# Patient Record
(demographics unavailable — no encounter records)

---

## 2019-01-16 NOTE — RADIOLOGY REPORT (SQ)
EXAM DESCRIPTION:  U/S OB LIMITED



COMPLETED DATE/TIME:  2019 11:38 am



REASON FOR STUDY:   contractions



COMPARISON:  None.



TECHNIQUE:  Limited transabdominal grayscale ultrasound for evaluation of specific requested obstetri
janett parameters.



LIMITATIONS:  None.



FINDINGS:  CERVICAL LENGTH: 4.3 cm.   Closed.

LUIS: 12 cm.

FHR: 153 beats per minute.

PRESENTATION: Cephalic.

PLACENTA: Posterior without evidence of abruption or previa.

FETAL ANATOMY: Not assessed

OTHER: No other significant findings.



IMPRESSION:  LIMITED OBSTETRICAL ULTRASOUND WITH MEASURED PARAMETERS DELINEATED ABOVE.

Trimester of pregnancy: Third trimester - 28 weeks to delivery.



TECHNICAL DOCUMENTATION:  JOB ID:  [id number]

  [employer]- All Rights Reserved



Reading location - IP/workstation name: [email or web address]

## 2019-01-16 NOTE — ADMISSION PHYSICAL
=================================================================



=================================================================

Datetime Report Generated by CPN: 2019 16:28

   

   

=================================================================

CURRENT ADMISSION

=================================================================

   

Chief Complaint:  Uterine Contractions

Indication for Induction:  Not Applicable

Admit Impression :  , Intrauterine Pregnancy

Admit Plan:  Admit to Unit; Initiate  Labor Protocol

   

=================================================================

ALLERGIES

=================================================================

   

Medication Allergies:  No

Medication Allergies:  No Known Allergies (2019)

Latex:  No Latex Allergies

Food Allergies:  None

Environmental Allergies:  none

   

=================================================================

OBSTETRICAL HISTORY

=================================================================

   

EDC:  2019 00:00

:  4

Para:  2

:  2

SAB:  1

Livin

Cesareans:  0

Gestational Diabetes:  No

Rh Sensitization:  No

Incompetent Cervix:  No

SHEREE:  No

Infertility:  No

ART Treatment:  No

Uterine Anomaly:  No

IUGR:  No

Hx Previous C/S:  No

Macrosomia:  No

Hx Loss/Stillborn:  No

PIH:  No

Hx  Death:  No

Placenta Previa/Abruption:  No

Depression/PP Depression:  Yes

PTL/PROM:  Yes

Post Partum Hemorrhage:  No

Current Pregnancy Procedures:  Ultrasound; NST

Obstetrical History Comments:  G1- , SAB

   G2- , 34 weeks, PTL, PROM, PTD, NICU x 1 month

   G3- , 32 weeks, PTL, PROM, PTD

   G4- Current

   

=================================================================

***SEE PRENATAL RECORDS***

=================================================================

   

Alcohol:  No

Marijuana :  No

Cocaine:  No

Other Illicit Drugs:  No

Cigarettes:  Never Smoker. [id number]

   

=================================================================

MEDICAL HISTORY

=================================================================

   

Diabetes:  No

Blood Transfusion:  No

Pulmonary Disease (Asthma, TB):  No

Breast Disease:  No

Hypertension:  No

Gyn Surgery:  No

Heart Disease:  No

Hosp/Surgery:  No

Autoimmune Disorder:  No

Anesthetic Complications:  No

Kidney Disease:  No

Abnormal Pap Smear:  No

Neuro/Epilepsy:  No

Psychiatric Disorders:  No

Other Medical Diseases:  No

Hepatitis/Liver Disease:  No

Significant Family History:  No

Varicosities/Phlebitis:  No

Trauma/Violence :  No

Thyroid Dysfunction:  Yes

Medical History Comments:  Depression 2016, Thyroid elevated TSH and on

   synthroid, Hospitalizations for childbirth.

   

=================================================================

INFECTIOUS HISTORY

=================================================================

   

Gonorrhea:  No

Genital Herpes:  No

Chlamydia:  No

Tuberculosis:  No

Syphilis:  No

Hepatitis:  No

HIV/AIDS Exposure:  No

Rash or Viral Illness:  No

HPV:  No

   

=================================================================

PHYSICAL EXAM

=================================================================

   

General:  Normal

HEENT:  Normal

Neurologic:  Normal

Thyroid:  Deferred

Heart:  Normal

Lungs:  Normal

Breast:  Deferred

Back:  Normal

Abdomen:  Normal

Genitourinary Exam:  Normal

Extremities:  Normal

DTRs:  Normal

Pelvic Type:  Adequate

Physical Exam Comments:  pelvis proven to 5#9

Vital Signs:  Reviewed; Within Normal Limits

   

=================================================================

VAGINAL EXAM

=================================================================

   

Dilatation:  1

Effacement:  0

Station:  -3

Contraction Comments:  q4-6 mins

   

=================================================================

FETUS A

=================================================================

   

EGA:  29.6

Monitoring:  External US

FHR- Baseline:  135

Variability:  Moderate 6-25bpm

Accelerations:  15X15

Decelerations:  None

FHR Category:  Category I

Estimated Fetal Weight (gm):  1400

Fetal Presentation:  Vertex

Fetal Presentation- Other:  by sono

Admit Comment:   with previous  deliveries at 19w 32w and

   34w presented at 29+6 with contractions q2 mins, painful. she was

   given procardia 10mg po, terbutaline 0.25 subcutaneously and

   betamethasone 12mg IM at 1255. c/w dr newman-pt started on magnesium

   sulfate for PTL and neurophophylaxis. after magnesium started,

   contractions spaced to 6mins apart and decreased pain. GBS culture

   sent to lab. 

   

=================================================================

PLANS FOR LABOR AND DELIVERY

=================================================================

   

Labor and Delivery:  None

Pain Management:  Natural

Feeding Preference:  Formula

Benefit of Breast Feed Discussed:  Yes

Circumcision:  N/A

   

=================================================================

INFORMED CONSENT

=================================================================

   

Assignment:  Lisa Newman MD

Signature:  Electronically signed by Angie Siegel CNM  on 2019 at

   16:27  with User ID: [email or web address]

:  Electronically signed by Angie Siegel CNM  on 2019 at 16:27  with

   User ID: [email or web address]

## 2019-01-21 NOTE — PDOC DISCHARGE SUMMARY
General





- Admit/Disc Date/PCP


Admission Date/Primary Care Provider: 


  19 14:12





  





Discharge Date: 19





- Discharge Diagnosis


(1)  uterine contractions in second trimester, antepartum


Is this a current diagnosis for this admission?: Yes   





- Additional Information


Discharge Diet: Regular


Discharge Activity: Activity As Tolerated, Pelvic Rest


Home Medications: 








Levothyroxine Sodium [Synthroid 0.075 mg Tablet] 0.075 mg PO DAILY 19 


Pantoprazole Sodium [Protonix] 40 mg PO DAILY 19 


Prenatal Vit No.130/Iron/Folic [Prenatal Vitamins] 1 each PO DAILY 19 











History of Present Illness


History of Present Illness: 


KIRA MAAZRIEGOS is a 32 year old female presented to L&D complaining of 

contractions.  Patient stated that she had a history of 2  deliveries.  

Patient denies spontaneous rupture membranes or leakage of fluid.  Patient 

reported good fetal movement.








Hospital Course


Hospital Course: 


Patient was placed on magnesium sulfate until her contractions spaced out.  In 

the meantime, she was given betamethasone 12 mg IM x2, 24 hours apart.  On day 

#2, the patient was weaned down from 2 mg of magnesium sulfate to 1 g and the 

magnesium was discontinued at approximately 12 PM.  Patient was very anxious to 

leave.








Physical Exam





- Physical Exam


General appearance: PRESENT: no acute distress


Respiratory exam: PRESENT: clear to auscultation sarwat


Cardiovascular exam: PRESENT: RRR


GI/Abdominal exam: PRESENT: normal bowel sounds, soft


Extremities exam: ABSENT: calf tenderness, clubbing, full ROM, joint swelling, 

pedal edema, tenderness, +1 edema, +2 edema, other





- Gynecological Exam


Cervix: normal - No cervical change





Result


Laboratory Results: 


                                        





                                 19 14:30 








Impressions: 


                                        





Obstetrics Ultrasound  19 10:31


IMPRESSION:  LIMITED OBSTETRICAL ULTRASOUND WITH MEASURED PARAMETERS DELINEATED 

ABOVE.


Trimester of pregnancy: Third trimester - 28 weeks to delivery.


 














Plan


Discharge Plan: 


1. discharge home


2.  Follow-up in the office as soon as possible


3.  Strict pelvic rest





Time Spent: Less than 30 Minutes